# Patient Record
Sex: FEMALE
[De-identification: names, ages, dates, MRNs, and addresses within clinical notes are randomized per-mention and may not be internally consistent; named-entity substitution may affect disease eponyms.]

---

## 2018-03-22 ENCOUNTER — HOSPITAL ENCOUNTER (OUTPATIENT)
Dept: HOSPITAL 88 - CT | Age: 73
End: 2018-03-22
Payer: COMMERCIAL

## 2018-03-22 DIAGNOSIS — I65.23: Primary | ICD-10-CM

## 2018-03-22 LAB
BUN SERPL-MCNC: 16 MG/DL (ref 7–26)
BUN/CREAT SERPL: 13 (ref 6–25)
EGFRCR SERPLBLD CKD-EPI 2021: 42 ML/MIN (ref 60–?)

## 2018-03-22 PROCEDURE — 36415 COLL VENOUS BLD VENIPUNCTURE: CPT

## 2018-03-22 PROCEDURE — 84520 ASSAY OF UREA NITROGEN: CPT

## 2018-03-22 PROCEDURE — 82565 ASSAY OF CREATININE: CPT

## 2018-03-22 PROCEDURE — 70498 CT ANGIOGRAPHY NECK: CPT

## 2018-03-26 NOTE — DIAGNOSTIC IMAGING REPORT
EXAMINATION: CT angiogram of the neck



CLINICAL HISTORY:Bilateral carotid artery stenoses follow-up

COMPARISON STUDIES:None

TECHNIQUE: Axial images were obtained from the thoracic inlet. Coronal and

sagittal images reconstructed from the axial data.

Intravenous contrast: 75 mL. Of Isovue 370, hydration protocol was used.



FINDINGS:



If present, stenosis of the carotid bulbs is measured based on NASCET criteria

i.e area of maximum stenosis compared to the cervical ICA distal to the bulb.



Aortic arch and major vessels:

Patent. No abnormalities.



Common carotid arteries:

Right: Patent. No abnormalities.

Left :Patent. No abnormalities.



Carotid bulbs:

Right: Patent. No abnormalities.

Left :Patent. No abnormalities.



Internal carotid arteries:

Right: Soft and calcified plaque in the right carotid bulb results in moderate

stenosis (greater than 70 %) otherwise the cervical internal carotid artery is

unremarkable.

Left: Minimal mostly soft atherosclerotic plaque in the left carotid bulb

results in mild stenoses (less than 50%), otherwise unremarkable cervical

internal carotid artery..



Vertebral arteries:

Patent. No abnormalities. The right is dominant.



IMPRESSION:



1.  Severe stenosis of the right carotid bulb (greater than 70%).



2.  Mild stenosis of the left carotid bulb (less than 50%).



3.  Unremarkable vertebral arteries.



Signed by: Dr. Pauline Ham M.D. on 3/26/2018 9:53 AM

## 2018-08-04 ENCOUNTER — HOSPITAL ENCOUNTER (EMERGENCY)
Dept: HOSPITAL 88 - ER | Age: 73
Discharge: HOME | End: 2018-08-04
Payer: COMMERCIAL

## 2018-08-04 VITALS — DIASTOLIC BLOOD PRESSURE: 69 MMHG | SYSTOLIC BLOOD PRESSURE: 130 MMHG

## 2018-08-04 VITALS — WEIGHT: 142 LBS | BODY MASS INDEX: 24.24 KG/M2 | HEIGHT: 64 IN

## 2018-08-04 DIAGNOSIS — I65.29: ICD-10-CM

## 2018-08-04 DIAGNOSIS — E11.9: ICD-10-CM

## 2018-08-04 DIAGNOSIS — I10: Primary | ICD-10-CM

## 2018-08-04 PROCEDURE — 99282 EMERGENCY DEPT VISIT SF MDM: CPT

## 2018-08-07 LAB
ANION GAP SERPL CALC-SCNC: 14.4 MMOL/L (ref 8–16)
BASOPHILS # BLD AUTO: 0.1 10*3/UL (ref 0–0.1)
BASOPHILS NFR BLD AUTO: 1.3 % (ref 0–1)
BUN SERPL-MCNC: 20 MG/DL (ref 7–26)
BUN/CREAT SERPL: 18 (ref 6–25)
CALCIUM SERPL-MCNC: 10.4 MG/DL (ref 8.4–10.2)
CHLORIDE SERPL-SCNC: 104 MMOL/L (ref 98–107)
CO2 SERPL-SCNC: 29 MMOL/L (ref 22–29)
DEPRECATED APTT PLAS QN: 28.8 SECONDS (ref 23.8–35.5)
DEPRECATED INR PLAS: 1.02
DEPRECATED NEUTROPHILS # BLD AUTO: 4.3 10*3/UL (ref 2.1–6.9)
EGFRCR SERPLBLD CKD-EPI 2021: 48 ML/MIN (ref 60–?)
EOSINOPHIL # BLD AUTO: 0.2 10*3/UL (ref 0–0.4)
EOSINOPHIL NFR BLD AUTO: 2.3 % (ref 0–6)
ERYTHROCYTE [DISTWIDTH] IN CORD BLOOD: 13.1 % (ref 11.7–14.4)
GLUCOSE SERPLBLD-MCNC: 88 MG/DL (ref 74–118)
HCT VFR BLD AUTO: 38.8 % (ref 34.2–44.1)
HGB BLD-MCNC: 12.7 G/DL (ref 12–16)
LYMPHOCYTES # BLD: 2.1 10*3/UL (ref 1–3.2)
LYMPHOCYTES NFR BLD AUTO: 29.4 % (ref 18–39.1)
MCH RBC QN AUTO: 29.7 PG (ref 28–32)
MCHC RBC AUTO-ENTMCNC: 32.7 G/DL (ref 31–35)
MCV RBC AUTO: 90.7 FL (ref 81–99)
MONOCYTES # BLD AUTO: 0.5 10*3/UL (ref 0.2–0.8)
MONOCYTES NFR BLD AUTO: 6.6 % (ref 4.4–11.3)
NEUTS SEG NFR BLD AUTO: 60.3 % (ref 38.7–80)
PLATELET # BLD AUTO: 271 X10E3/UL (ref 140–360)
POTASSIUM SERPL-SCNC: 4.4 MMOL/L (ref 3.5–5.1)
PROTHROMBIN TIME: 12.6 SECONDS (ref 11.9–14.5)
RBC # BLD AUTO: 4.28 X10E6/UL (ref 3.6–5.1)
SODIUM SERPL-SCNC: 143 MMOL/L (ref 136–145)

## 2018-08-07 NOTE — DIAGNOSTIC IMAGING REPORT
PROCEDURE: X-RAY CHEST, TWO VIEWS

COMPARISON: None.

INDICATIONS: chest pain 

 

FINDINGS:



The lungs are well-inflated. No focal consolidation, pleural effusion, 

or pneumothorax. Left upper lobe calcified granuloma.

 

Mild tortuosity and atherosclerotic calcification of the thoracic 

aorta. Normal heart size. No pulmonary edema.

 

No acute osseous abnormality. Bilateral acromioclavicular degenerative 

changes. Surgical clips project over the right upper quadrant of the 

abdomen likely related to prior cholecystectomy.

 

 

 

CONCLUSION:

No acute cardiopulmonary abnormality. 

 

 

Dictated by:  Immanuel Molina M.D. on 8/07/2018 at 12:28     

Electronically approved by:  Immanuel Molina M.D. on 8/07/2018 at 12:28

## 2018-08-08 ENCOUNTER — HOSPITAL ENCOUNTER (INPATIENT)
Dept: HOSPITAL 88 - OR | Age: 73
LOS: 2 days | Discharge: HOME | DRG: 39 | End: 2018-08-10
Attending: THORACIC SURGERY (CARDIOTHORACIC VASCULAR SURGERY) | Admitting: THORACIC SURGERY (CARDIOTHORACIC VASCULAR SURGERY)
Payer: COMMERCIAL

## 2018-08-08 VITALS — DIASTOLIC BLOOD PRESSURE: 66 MMHG | SYSTOLIC BLOOD PRESSURE: 133 MMHG

## 2018-08-08 VITALS — DIASTOLIC BLOOD PRESSURE: 66 MMHG | SYSTOLIC BLOOD PRESSURE: 137 MMHG

## 2018-08-08 VITALS — DIASTOLIC BLOOD PRESSURE: 65 MMHG | SYSTOLIC BLOOD PRESSURE: 132 MMHG

## 2018-08-08 VITALS — DIASTOLIC BLOOD PRESSURE: 63 MMHG | SYSTOLIC BLOOD PRESSURE: 126 MMHG

## 2018-08-08 VITALS — DIASTOLIC BLOOD PRESSURE: 64 MMHG | SYSTOLIC BLOOD PRESSURE: 140 MMHG

## 2018-08-08 VITALS — WEIGHT: 167 LBS | HEIGHT: 64 IN | BODY MASS INDEX: 28.51 KG/M2

## 2018-08-08 VITALS — DIASTOLIC BLOOD PRESSURE: 69 MMHG | SYSTOLIC BLOOD PRESSURE: 131 MMHG

## 2018-08-08 VITALS — SYSTOLIC BLOOD PRESSURE: 132 MMHG | DIASTOLIC BLOOD PRESSURE: 68 MMHG

## 2018-08-08 VITALS — SYSTOLIC BLOOD PRESSURE: 128 MMHG | DIASTOLIC BLOOD PRESSURE: 65 MMHG

## 2018-08-08 VITALS — SYSTOLIC BLOOD PRESSURE: 134 MMHG | DIASTOLIC BLOOD PRESSURE: 66 MMHG

## 2018-08-08 VITALS — SYSTOLIC BLOOD PRESSURE: 121 MMHG | DIASTOLIC BLOOD PRESSURE: 63 MMHG

## 2018-08-08 VITALS — DIASTOLIC BLOOD PRESSURE: 71 MMHG | SYSTOLIC BLOOD PRESSURE: 123 MMHG

## 2018-08-08 VITALS — SYSTOLIC BLOOD PRESSURE: 131 MMHG | DIASTOLIC BLOOD PRESSURE: 67 MMHG

## 2018-08-08 VITALS — SYSTOLIC BLOOD PRESSURE: 140 MMHG | DIASTOLIC BLOOD PRESSURE: 68 MMHG

## 2018-08-08 VITALS — DIASTOLIC BLOOD PRESSURE: 69 MMHG | SYSTOLIC BLOOD PRESSURE: 129 MMHG

## 2018-08-08 VITALS — DIASTOLIC BLOOD PRESSURE: 63 MMHG | SYSTOLIC BLOOD PRESSURE: 127 MMHG

## 2018-08-08 VITALS — DIASTOLIC BLOOD PRESSURE: 64 MMHG | SYSTOLIC BLOOD PRESSURE: 123 MMHG

## 2018-08-08 VITALS — SYSTOLIC BLOOD PRESSURE: 148 MMHG | DIASTOLIC BLOOD PRESSURE: 70 MMHG

## 2018-08-08 VITALS — DIASTOLIC BLOOD PRESSURE: 60 MMHG | SYSTOLIC BLOOD PRESSURE: 130 MMHG

## 2018-08-08 DIAGNOSIS — I65.21: Primary | ICD-10-CM

## 2018-08-08 DIAGNOSIS — I25.10: ICD-10-CM

## 2018-08-08 DIAGNOSIS — I73.9: ICD-10-CM

## 2018-08-08 DIAGNOSIS — E11.9: ICD-10-CM

## 2018-08-08 DIAGNOSIS — E78.5: ICD-10-CM

## 2018-08-08 DIAGNOSIS — Z79.4: ICD-10-CM

## 2018-08-08 DIAGNOSIS — I10: ICD-10-CM

## 2018-08-08 LAB
ALBUMIN SERPL-MCNC: 3.5 G/DL (ref 3.5–5)
ALBUMIN/GLOB SERPL: 1.1 {RATIO} (ref 0.8–2)
ALP SERPL-CCNC: 40 IU/L (ref 40–150)
ALT SERPL-CCNC: 13 IU/L (ref 0–55)
ANION GAP SERPL CALC-SCNC: 15.6 MMOL/L (ref 8–16)
BASOPHILS # BLD AUTO: 0.1 10*3/UL (ref 0–0.1)
BASOPHILS NFR BLD AUTO: 0.8 % (ref 0–1)
BUN SERPL-MCNC: 18 MG/DL (ref 7–26)
BUN/CREAT SERPL: 18 (ref 6–25)
CALCIUM SERPL-MCNC: 9.5 MG/DL (ref 8.4–10.2)
CHLORIDE SERPL-SCNC: 108 MMOL/L (ref 98–107)
CO2 SERPL-SCNC: 21 MMOL/L (ref 22–29)
DEPRECATED NEUTROPHILS # BLD AUTO: 4.9 10*3/UL (ref 2.1–6.9)
EGFRCR SERPLBLD CKD-EPI 2021: 55 ML/MIN (ref 60–?)
EOSINOPHIL # BLD AUTO: 0.1 10*3/UL (ref 0–0.4)
EOSINOPHIL NFR BLD AUTO: 1.8 % (ref 0–6)
ERYTHROCYTE [DISTWIDTH] IN CORD BLOOD: 13.2 % (ref 11.7–14.4)
GLOBULIN PLAS-MCNC: 3.3 G/DL (ref 2.3–3.5)
GLUCOSE SERPLBLD-MCNC: 106 MG/DL (ref 74–118)
HCT VFR BLD AUTO: 33.5 % (ref 34.2–44.1)
HGB BLD-MCNC: 11.4 G/DL (ref 12–16)
LYMPHOCYTES # BLD: 1 10*3/UL (ref 1–3.2)
LYMPHOCYTES NFR BLD AUTO: 16.6 % (ref 18–39.1)
MCH RBC QN AUTO: 30.2 PG (ref 28–32)
MCHC RBC AUTO-ENTMCNC: 34 G/DL (ref 31–35)
MCV RBC AUTO: 88.6 FL (ref 81–99)
MONOCYTES # BLD AUTO: 0.1 10*3/UL (ref 0.2–0.8)
MONOCYTES NFR BLD AUTO: 1.9 % (ref 4.4–11.3)
NEUTS SEG NFR BLD AUTO: 78.6 % (ref 38.7–80)
PLATELET # BLD AUTO: 217 X10E3/UL (ref 140–360)
POTASSIUM SERPL-SCNC: 3.6 MMOL/L (ref 3.5–5.1)
RBC # BLD AUTO: 3.78 X10E6/UL (ref 3.6–5.1)
SODIUM SERPL-SCNC: 141 MMOL/L (ref 136–145)

## 2018-08-08 PROCEDURE — 88304 TISSUE EXAM BY PATHOLOGIST: CPT

## 2018-08-08 PROCEDURE — 86900 BLOOD TYPING SEROLOGIC ABO: CPT

## 2018-08-08 PROCEDURE — 85730 THROMBOPLASTIN TIME PARTIAL: CPT

## 2018-08-08 PROCEDURE — 80048 BASIC METABOLIC PNL TOTAL CA: CPT

## 2018-08-08 PROCEDURE — 71046 X-RAY EXAM CHEST 2 VIEWS: CPT

## 2018-08-08 PROCEDURE — 88311 DECALCIFY TISSUE: CPT

## 2018-08-08 PROCEDURE — 03CK0ZZ EXTIRPATION OF MATTER FROM RIGHT INTERNAL CAROTID ARTERY, OPEN APPROACH: ICD-10-PCS | Performed by: THORACIC SURGERY (CARDIOTHORACIC VASCULAR SURGERY)

## 2018-08-08 PROCEDURE — 93005 ELECTROCARDIOGRAM TRACING: CPT

## 2018-08-08 PROCEDURE — 86850 RBC ANTIBODY SCREEN: CPT

## 2018-08-08 PROCEDURE — 03CH0Z6: ICD-10-PCS | Performed by: THORACIC SURGERY (CARDIOTHORACIC VASCULAR SURGERY)

## 2018-08-08 PROCEDURE — 97139 UNLISTED THERAPEUTIC PX: CPT

## 2018-08-08 PROCEDURE — 85610 PROTHROMBIN TIME: CPT

## 2018-08-08 PROCEDURE — 82948 REAGENT STRIP/BLOOD GLUCOSE: CPT

## 2018-08-08 PROCEDURE — 85025 COMPLETE CBC W/AUTO DIFF WBC: CPT

## 2018-08-08 PROCEDURE — 03UK0JZ SUPPLEMENT RIGHT INTERNAL CAROTID ARTERY WITH SYNTHETIC SUBSTITUTE, OPEN APPROACH: ICD-10-PCS | Performed by: THORACIC SURGERY (CARDIOTHORACIC VASCULAR SURGERY)

## 2018-08-08 PROCEDURE — 36415 COLL VENOUS BLD VENIPUNCTURE: CPT

## 2018-08-08 PROCEDURE — 80053 COMPREHEN METABOLIC PANEL: CPT

## 2018-08-08 PROCEDURE — 86920 COMPATIBILITY TEST SPIN: CPT

## 2018-08-08 RX ADMIN — SODIUM CHLORIDE SCH MLS/HR: 9 INJECTION, SOLUTION INTRAVENOUS at 19:37

## 2018-08-09 VITALS — SYSTOLIC BLOOD PRESSURE: 136 MMHG | DIASTOLIC BLOOD PRESSURE: 82 MMHG

## 2018-08-09 VITALS — SYSTOLIC BLOOD PRESSURE: 123 MMHG | DIASTOLIC BLOOD PRESSURE: 59 MMHG

## 2018-08-09 VITALS — SYSTOLIC BLOOD PRESSURE: 161 MMHG | DIASTOLIC BLOOD PRESSURE: 65 MMHG

## 2018-08-09 VITALS — DIASTOLIC BLOOD PRESSURE: 55 MMHG | SYSTOLIC BLOOD PRESSURE: 122 MMHG

## 2018-08-09 VITALS — SYSTOLIC BLOOD PRESSURE: 129 MMHG | DIASTOLIC BLOOD PRESSURE: 68 MMHG

## 2018-08-09 VITALS — SYSTOLIC BLOOD PRESSURE: 133 MMHG | DIASTOLIC BLOOD PRESSURE: 70 MMHG

## 2018-08-09 VITALS — DIASTOLIC BLOOD PRESSURE: 76 MMHG | SYSTOLIC BLOOD PRESSURE: 131 MMHG

## 2018-08-09 VITALS — DIASTOLIC BLOOD PRESSURE: 86 MMHG | SYSTOLIC BLOOD PRESSURE: 111 MMHG

## 2018-08-09 VITALS — DIASTOLIC BLOOD PRESSURE: 65 MMHG | SYSTOLIC BLOOD PRESSURE: 127 MMHG

## 2018-08-09 VITALS — SYSTOLIC BLOOD PRESSURE: 123 MMHG | DIASTOLIC BLOOD PRESSURE: 63 MMHG

## 2018-08-09 VITALS — SYSTOLIC BLOOD PRESSURE: 136 MMHG | DIASTOLIC BLOOD PRESSURE: 68 MMHG

## 2018-08-09 VITALS — SYSTOLIC BLOOD PRESSURE: 145 MMHG | DIASTOLIC BLOOD PRESSURE: 73 MMHG

## 2018-08-09 VITALS — DIASTOLIC BLOOD PRESSURE: 60 MMHG | SYSTOLIC BLOOD PRESSURE: 117 MMHG

## 2018-08-09 VITALS — DIASTOLIC BLOOD PRESSURE: 63 MMHG | SYSTOLIC BLOOD PRESSURE: 124 MMHG

## 2018-08-09 VITALS — SYSTOLIC BLOOD PRESSURE: 149 MMHG | DIASTOLIC BLOOD PRESSURE: 74 MMHG

## 2018-08-09 VITALS — DIASTOLIC BLOOD PRESSURE: 55 MMHG | SYSTOLIC BLOOD PRESSURE: 115 MMHG

## 2018-08-09 VITALS — DIASTOLIC BLOOD PRESSURE: 70 MMHG | SYSTOLIC BLOOD PRESSURE: 135 MMHG

## 2018-08-09 VITALS — DIASTOLIC BLOOD PRESSURE: 71 MMHG | SYSTOLIC BLOOD PRESSURE: 140 MMHG

## 2018-08-09 VITALS — DIASTOLIC BLOOD PRESSURE: 64 MMHG | SYSTOLIC BLOOD PRESSURE: 126 MMHG

## 2018-08-09 VITALS — SYSTOLIC BLOOD PRESSURE: 126 MMHG | DIASTOLIC BLOOD PRESSURE: 64 MMHG

## 2018-08-09 VITALS — SYSTOLIC BLOOD PRESSURE: 135 MMHG | DIASTOLIC BLOOD PRESSURE: 67 MMHG

## 2018-08-09 VITALS — SYSTOLIC BLOOD PRESSURE: 123 MMHG | DIASTOLIC BLOOD PRESSURE: 65 MMHG

## 2018-08-09 VITALS — DIASTOLIC BLOOD PRESSURE: 63 MMHG | SYSTOLIC BLOOD PRESSURE: 132 MMHG

## 2018-08-09 VITALS — DIASTOLIC BLOOD PRESSURE: 69 MMHG | SYSTOLIC BLOOD PRESSURE: 141 MMHG

## 2018-08-09 VITALS — SYSTOLIC BLOOD PRESSURE: 125 MMHG | DIASTOLIC BLOOD PRESSURE: 57 MMHG

## 2018-08-09 VITALS — SYSTOLIC BLOOD PRESSURE: 141 MMHG | DIASTOLIC BLOOD PRESSURE: 75 MMHG

## 2018-08-09 VITALS — SYSTOLIC BLOOD PRESSURE: 122 MMHG | DIASTOLIC BLOOD PRESSURE: 65 MMHG

## 2018-08-09 VITALS — DIASTOLIC BLOOD PRESSURE: 74 MMHG | SYSTOLIC BLOOD PRESSURE: 146 MMHG

## 2018-08-09 VITALS — SYSTOLIC BLOOD PRESSURE: 126 MMHG | DIASTOLIC BLOOD PRESSURE: 60 MMHG

## 2018-08-09 VITALS — DIASTOLIC BLOOD PRESSURE: 64 MMHG | SYSTOLIC BLOOD PRESSURE: 123 MMHG

## 2018-08-09 VITALS — SYSTOLIC BLOOD PRESSURE: 130 MMHG | DIASTOLIC BLOOD PRESSURE: 63 MMHG

## 2018-08-09 VITALS — DIASTOLIC BLOOD PRESSURE: 61 MMHG | SYSTOLIC BLOOD PRESSURE: 129 MMHG

## 2018-08-09 VITALS — SYSTOLIC BLOOD PRESSURE: 129 MMHG | DIASTOLIC BLOOD PRESSURE: 65 MMHG

## 2018-08-09 VITALS — SYSTOLIC BLOOD PRESSURE: 121 MMHG | DIASTOLIC BLOOD PRESSURE: 60 MMHG

## 2018-08-09 VITALS — DIASTOLIC BLOOD PRESSURE: 61 MMHG | SYSTOLIC BLOOD PRESSURE: 125 MMHG

## 2018-08-09 VITALS — DIASTOLIC BLOOD PRESSURE: 65 MMHG | SYSTOLIC BLOOD PRESSURE: 130 MMHG

## 2018-08-09 VITALS — DIASTOLIC BLOOD PRESSURE: 68 MMHG | SYSTOLIC BLOOD PRESSURE: 125 MMHG

## 2018-08-09 VITALS — SYSTOLIC BLOOD PRESSURE: 125 MMHG | DIASTOLIC BLOOD PRESSURE: 61 MMHG

## 2018-08-09 VITALS — SYSTOLIC BLOOD PRESSURE: 141 MMHG | DIASTOLIC BLOOD PRESSURE: 71 MMHG

## 2018-08-09 VITALS — SYSTOLIC BLOOD PRESSURE: 124 MMHG | DIASTOLIC BLOOD PRESSURE: 60 MMHG

## 2018-08-09 VITALS — SYSTOLIC BLOOD PRESSURE: 136 MMHG | DIASTOLIC BLOOD PRESSURE: 65 MMHG

## 2018-08-09 VITALS — DIASTOLIC BLOOD PRESSURE: 55 MMHG | SYSTOLIC BLOOD PRESSURE: 147 MMHG

## 2018-08-09 VITALS — SYSTOLIC BLOOD PRESSURE: 138 MMHG | DIASTOLIC BLOOD PRESSURE: 69 MMHG

## 2018-08-09 VITALS — SYSTOLIC BLOOD PRESSURE: 131 MMHG | DIASTOLIC BLOOD PRESSURE: 65 MMHG

## 2018-08-09 VITALS — SYSTOLIC BLOOD PRESSURE: 137 MMHG | DIASTOLIC BLOOD PRESSURE: 70 MMHG

## 2018-08-09 VITALS — SYSTOLIC BLOOD PRESSURE: 125 MMHG | DIASTOLIC BLOOD PRESSURE: 56 MMHG

## 2018-08-09 VITALS — DIASTOLIC BLOOD PRESSURE: 67 MMHG | SYSTOLIC BLOOD PRESSURE: 131 MMHG

## 2018-08-09 VITALS — DIASTOLIC BLOOD PRESSURE: 65 MMHG | SYSTOLIC BLOOD PRESSURE: 161 MMHG

## 2018-08-09 VITALS — DIASTOLIC BLOOD PRESSURE: 78 MMHG | SYSTOLIC BLOOD PRESSURE: 147 MMHG

## 2018-08-09 LAB
ANION GAP SERPL CALC-SCNC: 13.7 MMOL/L (ref 8–16)
BASOPHILS # BLD AUTO: 0 10*3/UL (ref 0–0.1)
BASOPHILS NFR BLD AUTO: 0.1 % (ref 0–1)
BUN SERPL-MCNC: 18 MG/DL (ref 7–26)
BUN/CREAT SERPL: 20 (ref 6–25)
CALCIUM SERPL-MCNC: 9.5 MG/DL (ref 8.4–10.2)
CHLORIDE SERPL-SCNC: 105 MMOL/L (ref 98–107)
CO2 SERPL-SCNC: 24 MMOL/L (ref 22–29)
DEPRECATED NEUTROPHILS # BLD AUTO: 7.5 10*3/UL (ref 2.1–6.9)
EGFRCR SERPLBLD CKD-EPI 2021: > 60 ML/MIN (ref 60–?)
EOSINOPHIL # BLD AUTO: 0 10*3/UL (ref 0–0.4)
EOSINOPHIL NFR BLD AUTO: 0 % (ref 0–6)
ERYTHROCYTE [DISTWIDTH] IN CORD BLOOD: 13.2 % (ref 11.7–14.4)
GLUCOSE SERPLBLD-MCNC: 149 MG/DL (ref 74–118)
HCT VFR BLD AUTO: 32.3 % (ref 34.2–44.1)
HGB BLD-MCNC: 10.9 G/DL (ref 12–16)
LYMPHOCYTES # BLD: 0.8 10*3/UL (ref 1–3.2)
LYMPHOCYTES NFR BLD AUTO: 8.9 % (ref 18–39.1)
MCH RBC QN AUTO: 29.9 PG (ref 28–32)
MCHC RBC AUTO-ENTMCNC: 33.7 G/DL (ref 31–35)
MCV RBC AUTO: 88.5 FL (ref 81–99)
MONOCYTES # BLD AUTO: 0.2 10*3/UL (ref 0.2–0.8)
MONOCYTES NFR BLD AUTO: 2.6 % (ref 4.4–11.3)
NEUTS SEG NFR BLD AUTO: 88.2 % (ref 38.7–80)
PLATELET # BLD AUTO: 225 X10E3/UL (ref 140–360)
POTASSIUM SERPL-SCNC: 3.7 MMOL/L (ref 3.5–5.1)
RBC # BLD AUTO: 3.65 X10E6/UL (ref 3.6–5.1)
SODIUM SERPL-SCNC: 139 MMOL/L (ref 136–145)

## 2018-08-09 RX ADMIN — AMLODIPINE BESYLATE SCH MG: 10 TABLET ORAL at 08:19

## 2018-08-09 RX ADMIN — SODIUM CHLORIDE SCH MLS/HR: 9 INJECTION, SOLUTION INTRAVENOUS at 07:47

## 2018-08-09 RX ADMIN — METOPROLOL SUCCINATE SCH MG: 25 TABLET, EXTENDED RELEASE ORAL at 08:19

## 2018-08-09 RX ADMIN — SODIUM CHLORIDE SCH MLS/HR: 9 INJECTION, SOLUTION INTRAVENOUS at 04:52

## 2018-08-09 NOTE — OPERATIVE REPORT
DATE OF PROCEDURE:  August 08, 2018 



ASSISTANT:  Anoop Cheng



PREOPERATIVE DIAGNOSIS:  Severe right carotid stenosis.



POSTOPERATIVE DIAGNOSIS:  Severe right carotid stenosis.



TITLE OF OPERATION:  Right carotid endarterectomy.



DESCRIPTION OF OPERATION:  After the satisfactory accomplishment of general 

anesthesia, the patient's right neck was prepped and draped in a sterile 

fashion.  A standard right carotid incision was made along the anterior 

border of the sternomastoid muscle.  The incision was carried down through 

the subcutaneous tissues to expose the right common carotid artery.  The 

vessel was dissected free from the surrounding tissues and looped with a 

vessel loop.  The dissection was carried distally to expose the internal 

carotid artery and the external carotid artery and its branches.  Care was 

taken to identify and preserve all nerve structures in the region.  

Systemic heparin was given through a central vein cannula for the purposes 

of anticoagulation.  The common, external and internal carotid arteries 

were briefly cross-clamped.  A long incision was made in the common carotid 

artery and carried through the bifurcation and well up into the internal 

carotid artery.  A severely obstructing, atherosclerotic plaque was quickly 

encountered.  The plaque was removed using standard endarterectomy 

techniques.  Following this, an indwelling shunt was placed in the common 

carotid artery proximally and the internal carotid artery distally, thereby 

re-establishing blood flow to the right side of the brain for the remainder 

of the case.  The surface of the vessel was then smoothed, and all loose 

debris was carefully removed.  Heparinized saline flushes were routinely 

employed.  A previously constructed Dacron patch was brought into the 

operative field and used to close the arteriotomy site.  Running 7-0 

Prolene was used for this patch closure.  Prior to completing the closure, 

the shunt was removed, and the vessel was flushed free from all air and 

debris.  Once the sutures were tied, excellent pulses were located within 

the patch area and beyond.  Protamine was given to counteract the effects 

of the heparin.  The cannulas were removed, and the pursestring sutures 

were tied.  All bleeding points were carefully cauterized, ligated or 

oversewn.  The wound was thoroughly irrigated with antibiotic solution and 

closed in layers with interrupted 2-0 Vicryl for the deep tissues and 

Monocryl subcuticular stitches for the skin. 



The patient tolerated the procedure well and was returned to the intensive 

care unit in good condition.



 





DD:  08/09/2018 10:45

DT:  08/09/2018 10:55

Job#:  [id number]

## 2018-08-09 NOTE — CONSULTATION
DATE OF CONSULTATION:  August 08, 2018 



CARDIOLOGY CONSULTATION



REASON FOR CONSULTATION:  Status post right CEA.  



HPI:  This is a pleasant 73-year-old female with a history of severe right 

carotid stenosis.  She was having dizziness and lightheaded.  She did a 

carotid Doppler of the neck that showed severe carotid stenosis.  She was 

brought in as an outpatient.  She underwent a right CEA.  She is doing 

better and recovering in the ICU.  She has a history of hypertension.  She 

used to smoke, but she quit last week.  She denied any chest pain, any 

palpitation, any shortness of breath, or diaphoresis.  



PAST MEDICAL HISTORY:  Severe right carotid stenosis, GI bleed, 

hypertension, hyperlipidemia, diabetes, anemia, PVD, and tobacco abuse.  



PAST SURGICAL HISTORY:  Cholecystectomy and eye surgery for glaucoma.



FAMILY HISTORY:  Noncontributory.



SOCIAL HISTORY:  She lives at home with family.  She stated she quit 

smoking last week.



MEDICATIONS:  See med list.



ALLERGIES:  SHE IS NOT ALLERGIC TO ANY MEDICATION.



REVIEW OF SYSTEMS:  Negative except those mentioned above.  She is status 

post right CEA. 

 



PHYSICAL EXAMINATION

VITAL SIGNS:  Temperature 97, heart rate 76, blood pressure 147/55, 

respirations 16, oxygen saturation 97% on room air. 

GENERAL:  She is awake, alert and oriented times 3. 

HEENT:  Mucous membrane moist.  She has a dressing on the right neck with 

ice pack.

NECK:  Supple with dressing on the right side. 

LUNGS:  Clear to auscultation. 

CARDIOVASCULAR:  S1 and S2 present. 

ABDOMEN:  Soft. 

NEUROLOGICAL:  Intact. 

EXTREMITIES:  With no edema. 



LABS:  Sodium 139, potassium 3.7, chloride 105, CO2 24, BUN 18, creatinine 

0.89, glucose 149.  White blood cell 8.53, hemoglobin 10.9, hematocrit 

32.3, and platelets 225,000.  PT 12.6, PTT 28.8 and INR 1.04.  



IMPRESSION 

1.  Right carotid stenosis.

2.  Hypertension.  

3.  Diabetes.

4.  Hyperlipidemia.

5.  Tobacco abuse.

6.  History of anemia.

7.  History of peripheral vascular disease. 



ASSESSMENT AND PLAN:  She is status post right CEA and recovering good.  

Blood pressure and heart rate stable.  Will continue home medications.  PT 

to get her out of the bed.  If stable, she can be discharged home today.  

Further cardiac workup pending clinical course.  





Thank you for this consult.



DICTATED BY ALEXUS WILKINS NP





 





DD:  08/09/2018 08:12

DT:  08/09/2018 08:32

Job#:  [id number]

## 2018-08-09 NOTE — CONSULTATION
DATE OF CONSULTATION:  August 09, 2018 



PULMONARY/CRITICAL CARE CONSULTATION



REFERRING PHYSICIAN:  Dr. Gates.



CHIEF COMPLAINT:  Hypertension, peripheral vascular disease and recent 

carotid endarterectomy.  



HISTORY OF PRESENT ILLNESS:  The patient is a 73-year-old woman.  She has a 

history of hypertension, diabetes and peripheral vascular disease.  She has 

symptoms of presyncope and lightheadedness.  She went for an ultrasound of 

the carotid and subsequent CT angiogram that showed an 80% stenosis of the 

right internal carotid.  She was subsequently referred for surgery.



She went for carotid endarterectomy.  The procedure went well.  There was 

minimal blood loss.  She is in the ICU now.  She has an A line in place.  

She does have some incisional pain, but has no focal neurological 

complaints.



PAST MEDICAL HISTORY:

1. Hypertension. 

2. Diabetes.

3. Peripheral vascular disease.



PAST SURGICAL HISTORY:  Recent carotid endarterectomy. 



SOCIAL HISTORY:  The patient is not an active drinker or smoker.



FAMILY HISTORY:  Family history is noncontributory.



ALLERGIES:  THERE ARE NO KNOWN DRUG ALLERGIES.





REVIEW OF SYSTEMS:  There is no fever.  She has no headache.  She is 

complaining of some incisional pain in the neck.  She has no chest pain.  

She has no difficulty breathing.  She has no cough or wheezing.  She has no 

abdominal pain.  She has no nausea or vomiting.  She has no focal 

neurological complaints. 

 

PHYSICAL EXAMINATION:

VITAL SIGNS:  The patient is afebrile.  The blood pressure is 124/60 and 

the saturation is 97%. 

HEENT:  Shows no facial swelling or erythema.  The nasal mucosa is normal. 

CARDIAC:  Reveals a regular rate and rhythm with normal S1 and S2.  There 

are no murmurs or rubs.  

LUNGS:  Auscultation reveals clear breath sounds bilaterally.  There is no 

wheezing. 

ABDOMEN:  Soft, nontender.  There is no rebound or guarding. 

EXTREMITIES:  Shows no underlying edema or calf tenderness.  There is no 

cyanosis or clubbing.

SKIN:  No rashes. 

NEUROLOGIC:  Shows no focal abnormalities. 



LABORATORY DATA:  Electrolytes:  BUN and creatinine are within normal 

limits.  The CBC is normal.  PT and INR are normal. 



IMPRESSION:  

1. Recent carotid endarterectomy. 

2. Hypertension.

3. Diabetes.



PLAN:  

1. Remove arterial line.

2. Mobilize patient.



3. Control blood pressure.

4. Tentative discharged later today or tomorrow. 









DD:  08/09/2018 07:47

DT:  08/09/2018 08:13

Job#:  [id number]

## 2018-08-10 VITALS — SYSTOLIC BLOOD PRESSURE: 136 MMHG | DIASTOLIC BLOOD PRESSURE: 63 MMHG

## 2018-08-10 VITALS — SYSTOLIC BLOOD PRESSURE: 133 MMHG | DIASTOLIC BLOOD PRESSURE: 55 MMHG

## 2018-08-10 VITALS — SYSTOLIC BLOOD PRESSURE: 163 MMHG | DIASTOLIC BLOOD PRESSURE: 73 MMHG

## 2018-08-10 VITALS — SYSTOLIC BLOOD PRESSURE: 137 MMHG | DIASTOLIC BLOOD PRESSURE: 68 MMHG

## 2018-08-10 RX ADMIN — AMLODIPINE BESYLATE SCH MG: 10 TABLET ORAL at 08:36

## 2018-08-10 RX ADMIN — SODIUM CHLORIDE SCH MLS/HR: 9 INJECTION, SOLUTION INTRAVENOUS at 11:00

## 2018-08-10 RX ADMIN — SODIUM CHLORIDE SCH MLS/HR: 9 INJECTION, SOLUTION INTRAVENOUS at 01:00

## 2018-08-10 RX ADMIN — METOPROLOL SUCCINATE SCH MG: 25 TABLET, EXTENDED RELEASE ORAL at 08:37

## 2018-10-10 ENCOUNTER — HOSPITAL ENCOUNTER (EMERGENCY)
Dept: HOSPITAL 88 - ER | Age: 73
Discharge: HOME | End: 2018-10-10
Payer: COMMERCIAL

## 2018-10-10 VITALS — HEIGHT: 64 IN | WEIGHT: 167 LBS | BODY MASS INDEX: 28.51 KG/M2

## 2018-10-10 DIAGNOSIS — I10: Primary | ICD-10-CM

## 2018-10-10 DIAGNOSIS — Z87.891: ICD-10-CM

## 2018-10-10 DIAGNOSIS — Z82.49: ICD-10-CM

## 2018-10-10 PROCEDURE — 93005 ELECTROCARDIOGRAM TRACING: CPT

## 2018-10-10 PROCEDURE — 99282 EMERGENCY DEPT VISIT SF MDM: CPT

## 2020-11-22 ENCOUNTER — HOSPITAL ENCOUNTER (EMERGENCY)
Dept: HOSPITAL 88 - ER | Age: 75
LOS: 1 days | Discharge: HOME | End: 2020-11-23
Payer: COMMERCIAL

## 2020-11-22 VITALS — BODY MASS INDEX: 24.41 KG/M2 | HEIGHT: 64 IN | WEIGHT: 143 LBS

## 2020-11-22 DIAGNOSIS — R00.2: Primary | ICD-10-CM

## 2020-11-22 DIAGNOSIS — E11.65: ICD-10-CM

## 2020-11-22 DIAGNOSIS — I10: ICD-10-CM

## 2020-11-22 DIAGNOSIS — E78.5: ICD-10-CM

## 2020-11-22 DIAGNOSIS — I16.0: ICD-10-CM

## 2020-11-22 DIAGNOSIS — F17.210: ICD-10-CM

## 2020-11-22 LAB
ALBUMIN SERPL-MCNC: 3.7 G/DL (ref 3.5–5)
ALBUMIN/GLOB SERPL: 1.1 {RATIO} (ref 0.8–2)
ALP SERPL-CCNC: 49 IU/L (ref 40–150)
ALT SERPL-CCNC: 14 IU/L (ref 0–55)
ANION GAP SERPL CALC-SCNC: 15.8 MMOL/L (ref 8–16)
BASOPHILS # BLD AUTO: 0.1 10*3/UL (ref 0–0.1)
BASOPHILS NFR BLD AUTO: 1.1 % (ref 0–1)
BUN SERPL-MCNC: 20 MG/DL (ref 7–26)
BUN/CREAT SERPL: 16 (ref 6–25)
CALCIUM SERPL-MCNC: 9.7 MG/DL (ref 8.4–10.2)
CHLORIDE SERPL-SCNC: 105 MMOL/L (ref 98–107)
CK MB SERPL-MCNC: 1.1 NG/ML (ref 0–5)
CK SERPL-CCNC: 58 IU/L (ref 29–168)
CO2 SERPL-SCNC: 23 MMOL/L (ref 22–29)
DEPRECATED NEUTROPHILS # BLD AUTO: 1.9 10*3/UL (ref 2.1–6.9)
EGFRCR SERPLBLD CKD-EPI 2021: 43 ML/MIN (ref 60–?)
EOSINOPHIL # BLD AUTO: 0.2 10*3/UL (ref 0–0.4)
EOSINOPHIL NFR BLD AUTO: 4.2 % (ref 0–6)
ERYTHROCYTE [DISTWIDTH] IN CORD BLOOD: 13.8 % (ref 11.7–14.4)
GLOBULIN PLAS-MCNC: 3.3 G/DL (ref 2.3–3.5)
GLUCOSE SERPLBLD-MCNC: 176 MG/DL (ref 74–118)
HCT VFR BLD AUTO: 32.8 % (ref 34.2–44.1)
HGB BLD-MCNC: 10.7 G/DL (ref 12–16)
LYMPHOCYTES # BLD: 2.1 10*3/UL (ref 1–3.2)
LYMPHOCYTES NFR BLD AUTO: 43.7 % (ref 18–39.1)
MCH RBC QN AUTO: 29.9 PG (ref 28–32)
MCHC RBC AUTO-ENTMCNC: 32.6 G/DL (ref 31–35)
MCV RBC AUTO: 91.6 FL (ref 81–99)
MONOCYTES # BLD AUTO: 0.5 10*3/UL (ref 0.2–0.8)
MONOCYTES NFR BLD AUTO: 10.1 % (ref 4.4–11.3)
NEUTS SEG NFR BLD AUTO: 40.5 % (ref 38.7–80)
PLATELET # BLD AUTO: 200 X10E3/UL (ref 140–360)
POTASSIUM SERPL-SCNC: 3.8 MMOL/L (ref 3.5–5.1)
RBC # BLD AUTO: 3.58 X10E6/UL (ref 3.6–5.1)
SODIUM SERPL-SCNC: 140 MMOL/L (ref 136–145)

## 2020-11-22 PROCEDURE — 82553 CREATINE MB FRACTION: CPT

## 2020-11-22 PROCEDURE — 82550 ASSAY OF CK (CPK): CPT

## 2020-11-22 PROCEDURE — 85025 COMPLETE CBC W/AUTO DIFF WBC: CPT

## 2020-11-22 PROCEDURE — 71045 X-RAY EXAM CHEST 1 VIEW: CPT

## 2020-11-22 PROCEDURE — 99284 EMERGENCY DEPT VISIT MOD MDM: CPT

## 2020-11-22 PROCEDURE — 80053 COMPREHEN METABOLIC PANEL: CPT

## 2020-11-22 PROCEDURE — 84484 ASSAY OF TROPONIN QUANT: CPT

## 2020-11-22 PROCEDURE — 36415 COLL VENOUS BLD VENIPUNCTURE: CPT

## 2020-11-22 PROCEDURE — 83880 ASSAY OF NATRIURETIC PEPTIDE: CPT

## 2020-11-22 PROCEDURE — 93005 ELECTROCARDIOGRAM TRACING: CPT

## 2020-11-22 PROCEDURE — 70450 CT HEAD/BRAIN W/O DYE: CPT

## 2020-11-22 RX ADMIN — ASPIRIN 81 MG CHEWABLE TABLET ONE MG: 81 TABLET CHEWABLE at 22:13

## 2020-11-22 RX ADMIN — SODIUM CHLORIDE STA MLS/HR: 9 INJECTION, SOLUTION INTRAVENOUS at 22:13

## 2020-11-22 NOTE — EMERGENCY DEPARTMENT NOTE
History of Present Illnes


History of Present Illness


Chief Complaint:  Hypertension


History of Present Illness


This is a 75 year old  female  with onset of palpitations and dizziness 

at . Denies CP or SOB. at Home noted SBP of 199 mmHg


Historian:  Patient, Paramedic/EMS


Arrival Mode:  HFD


Onset (how long ago):  hour(s) (1)


Location:  chest and head


Radiation:  Reports non-radiation


Severity:  moderate


Onset quality:  sudden


Duration (how long):  hour(s) (2)


Timing of current episode:  constant


Progression:  partially resolved


Chronicity:  new


Context:  Denies recent illness, Denies recent surgery, Denies recent 

immobilization, Denies recent travel, Denies trauma/injury, Denies new med

ications, Denies hx of DVT/PE, Denies non-compliance w/ medications, Denies 

other


Relieving factors:  none


Exacerbating factors:  none


Associated symptoms:  Denies chest pain, Denies syncope, Denies weakness


Treatments prior to arrival:  none





Past Medical/Family History


Physician Review


I have reviewed the patient's past medical and family history.  Any updates have

been documented here.





Past Medical History


Recent Fever:  No


Clinical Suspicion of Infectio:  No


New/Unexplained Change in Ment:  No


Past Medical History:  Hypertension, Diabetes, CAD, Hyperlipedemia


Other Medical History:  


smoker 40 yrs


Past Surgical History:  Cholecysctectomy, Cataract Removal


Other Surgery:  


Retina repair





CATROID





Social History


Smoking Cessation:  Current some day smoker


Counseling Performed:  Yes


Alcohol Use:  None





Other


Last Tetanus:  OOD





Review of Systems


Review of Systems


Constitutional:  Reports no symptoms


EENTM:  Reports no symptoms


Cardiovascular:  Reports palpitations


Respiratory:  Reports no symptoms


Gastrointestinal:  Reports no symptoms


Genitourinary:  Reports no symptoms


Musculoskeletal:  Reports no symptoms


Integumentary:  Reports no symptoms


Neurological:  Reports other (dizziness)


Psychological:  Reports no symptoms


Endocrine:  Reports no symptoms


Hematological/Lymphatic:  Reports no symptoms





Physical Exam


Related Data


Allergies:  


Coded Allergies:  


     No Known Allergies (Unverified , 17)


Vital signs reviewed:  Yes





Physical Exam


CONSTITUTIONAL





Constitutional:  Present well-developed, Present well-nourished


HENT


HENT:  Present normocephalic, Present atraumatic, Present oropharynx 

clear/moist, Present nose normal


HENT L/R:  Present left ext ear normal, Present right ext ear normal


EYES





Eyes:  Reports PERRL, Reports conjunctivae normal


NECK


Neck:  Present ROM normal


PULMONARY


Pulmonary:  Present effort normal, Present breath sounds normal


CARDIOVASCULAR





Cardiovascular:  Present regular rhythm, Present heart sounds normal, Present 

capillary refill normal, Present normal rate


GASTROINTESTINAL





Abdominal:  Present soft, Present nontender, Present bowel sounds normal


GENITOURINARY





Genitourinary:  Present exam deferred


SKIN


Skin:  Present warm, Present dry


MUSCULOSKELETAL





Musculoskeletal:  Present ROM normal


NEUROLOGICAL





Neurological:  Present alert, Present oriented x 3, Present no gross motor or 

sensory deficits


PSYCHOLOGICAL


Psychological:  Present mood/affect normal, Present judgement normal





Results


Laboratory


Lab results reviewed:  Yes





Imaging


Imaging results reviewed:  Yes


Impressions


                                        


                        [facility]


            [address] [address]








Patient Name: CADEN NEW                          MR #: [id number]           


  


: 1945                         Age/Sex: 75/F


Acct #: [id number]                    Req #: [id number]


Adm Physician:                                      


Ordered by: LALITA DOUGLAS DO                    Report #: [id number] 


Location: ER                            Room/Bed:           


                                                                                


________________________________________________________________________________


___________________





Procedure: [id number] CT/CT BRAIN WO


Exam Date: 20                            Exam Time: 3








                              REPORT STATUS: Signed





Exam: Head CT without contrast


History:  Dizziness


Comparison studies:  None





Technique:


Axial images were obtained from the skull base to the vertex.


Coronal and sagittal images reconstructed from the axial data.  Dose


modulation, iterative reconstruction, and/or weight based adjustment of the


mA/kV was utilized to reduce the radiation dose to as low as reasonably


achievable.  





Radiation dose: 


Total DLP: 921 mGy*cm. 


Estimated effective dose: DLP x 0.015 


Intravenous contrast: None





Findings:





Scalp: No abnormalities.


Bones: No fractures, blastic or lytic lesions.





Brain sulci: Mildly prominent.


Ventricles: Normal in size and configuration. No hydrocephalus.


Extra-axial spaces: No masses, no fluid collection. 





Parenchyma: 


A few scattered hypodensities in the supratentorial white matter are


nonspecific but are most compatible with chronic microvascular ischemic


changes. No mass, acute hemorrhage or acute or chronic cortical insults.





Sellar/suprasellar region: No abnormalities.


Craniocervical junction: Patent foramen magnum. No Chiari one malformation.





Incidental findings: 


Atherosclerotic calcifications in the carotid siphons in the right intradural


vertebral artery.





 IMPRESSION:


 


1.  No acute intracranial abnormalities.


2.  Mild chronic microvascular ischemic changes.





Signed by: Dr. Josh Rodriguez M.D. on 2020 10:41 PM








Dictated By: JOSH RODRIGUEZ MD


Electronically Signed By: JOSH RODRIGUEZ MD on 20


Transcribed By: ROSENDO on 20 








COPY TO:   LALITA DOUGLAS DO~





Procedures


12 Lead ECG Interpretation


ECG Interpretation :  


   ECG:  ECG 1


   :  Interpreted by ED physician


   Date:  2020


   Time:  21:52


   Prior ECG tracings:  reviewed


   Rhythm:  sinus rhythm


   Rate:  normal


   BPM:  76


   QRS axis:  normal


   ST segments normal:  Yes


   T waves normal:  Yes


   Other findings:  PRWP


   Clinical Impression:  non-specific ECG





Assessment & Plan


Medical Decision Making


MDM


Diff Dx : ACS, Intracranial brain bleeding, PNA, PTX





Assessment & Plan


Final Impression:  


(1) Palpitations


(2) Hypertensive urgency


Home Meds


Reported Medications


Aspirin (ASPIR 81) 81 Mg Tablet.dr, 81 MG PO DAILY


   18


Triamterene/Hydrochlorothiazid (TRIAMTERENE-HCTZ 37.5-25 MG CP) 1 Each Capsule, 

37.5 MG PO DAILY


   17


Metoprolol Succinate (METOPROLOL SUCCINATE) 25 Mg Tab.er.24h, 25 MG PO DAILY


   17


Pravastatin Sodium (PRAVASTATIN SODIUM) 80 Mg Tablet, 80 MG PO DAILY


   THERAPEUTICALLY SUBSTITUTED WITH SIMVASTATIN 40MG


   17


Amlodipine Besylate (NORVASC) 10 Mg Tab, 10 MG PO DAILY


   17


Fenofibrate (FENOFIBRATE) 200 Mg Cap, 200 MG PO HS


   17


Metformin Hcl (METFORMIN HCL) 500 Mg Tablet, 500 MG PO DAILY, #60 TAB


   17











LALITA DOUGLAS DO                2020 21:58

## 2020-11-22 NOTE — XMS REPORT
Continuity of Care Document

                             Created on: 2020



CADEN NEW

External Reference #: [id number]

: 1945

Sex: Female



Demographics





                          Address                   [address] [place]  [address]

 

                          Home Phone                [phone]

 

                          Preferred Language        Unknown

 

                          Marital Status            Unknown

 

                          Catholic Affiliation     Unknown

 

                          Race                      Unknown

 

                                        Additional Race(s)  

 

                          Ethnic Group              Unknown





Author





                          Author                    [facility]

t

 

                          Organization              [facility]

 

                          Address                   [address]

[place]  [address]



 

                          Phone                     Unavailable







Care Team Providers





                    Care Team Member Name Role                Phone

 

                    STACIA GONZALEZ,  ANKUSH PCP                 [phone]

 

                    LALITA DOUGLAS      Attphys             Unavailable

 

                    EDILMA AKHTAR   Attphys             Unavailable

 

                    JARON NUNO   Attphys             Unavailable

 

                    APARNA SANDERS    Attphys             Unavailable

 

                    MARISA MARI       Attphys             Unavailable

 

                    JOSE PRASAD        Admphys             Unavailable







Payers





           Payer Name Policy Type Policy Number Effective Date Expiration Date S

ource

 

           [employer]            [id number]  2018 00:00:00            [facility]







Problems





           Condition Name Condition Details Condition Category Status     Onset 

Date Resolution

Date            Last Treatment Date Treating Clinician Comments        Source

 

       Cholelithiasis Cholelithiasis Problem Active                             

       [facility]







Allergies, Adverse Reactions, Alerts

This patient has no known allergies or adverse reactions.



Medications





             Ordered Medication Name Filled Medication Name Start Date   Stop Da

te    Current 

Medication? Ordering Clinician Indication Dosage     Frequency  Signature (SIG) 

Comments                  Components                Source

 

                    Amlodipine Besylate (Norvasc) 10 Mg Tab Amlodipine Besylate 

(Norvasc) 10 Mg Tab 

              Yes                  10            Daily                [facility]

 

          Aspirin (Aspir 81) 81 Mg Tablet. Aspirin (Aspir 81) 81 Mg Tablet. 

                    Yes                 

           81                    Daily                            [facility]

 

     Fenofibrate 200 Mg Cap Fenofibrate 200 Mg Cap           Yes            200 

      Bedtime           [facility]

 

      Metformin Hcl 500 Mg Tablet Metformin Hcl 500 Mg Tablet             Yes   

            500         Daily 

                                                    [facility]

 

             Metoprolol Succinate 25 Mg Tab.er.24h Metoprolol Succinate 25 Mg Ta

b.er.24h                           

Yes                     25              Daily                   [facility]

 

        Pravastatin Sodium 80 Mg Tablet Pravastatin Sodium 80 Mg Tablet         

        Yes                     80       

                Daily                                           [facility]

 

                          Triamterene/Hydrochlorothiazid (Triamterene-Hctz 37.5-

25 Mg Cp) 1 Each Capsule 

Triamterene/Hydrochlorothiazid (Triamterene-Hctz 37.5-25 Mg Cp) 1 Each Capsule  

              Yes                  37.5          Daily                [facility]

 

                                        Acetaminophen With Codeine (Tylenol With

 Codeine #3 Tablet) 1 Each Tablet, 300 

Mg Oral                                 Acetaminophen With Codeine (Tylenol With

 Codeine #3 Tablet) 1 Each 

Tablet, 300 Mg Oral         2018 00:00:00 No                      300     

        Every 4 Hours as needed 

for Pain                                                    [facility]

 

                          Levofloxacin (Levaquin) 500 Mg Tablet, 500 Mg Oral Lev

ofloxacin (Levaquin) 500 

Mg Tablet, 500 Mg Oral       2018 00:00:00 No                500         D

aily             [facility]

 

                          Ondansetron (Zofran Odt) 4 Mg Tab.rapdis, 4 Mg Subling

ual Ondansetron (Zofran 

Odt) 4 Mg Tab.rapdis, 4 Mg Sublingual         2018 00:00:00 No            

          4               Every 6 

Hours as needed for Nausea And Vomiting                                         

[facility]

 

                Aspirin 81 Mg Tab.chew, 81 Mg Oral Aspirin 81 Mg Tab.chew, 81 Mg

 Oral                 

2017-04-15 00:00:00 No                      81              Daily               

    [facility]







Procedures





                Procedure       Date / Time Performed Performing Clinician Sour

e

 

                    EXTIRPATE MATTER FROM R COM CAROTID, BIFURC, OPEN 2018

 00:00:00 [facility]

 

                    EXTIRPATION OF MATTER FROM R INT CAROTID, OPEN APPROACH 2018 00:00:00 

[facility]

 

                    SUPPLEMENT R INT CAROTID WITH SYNTH SUB, OPEN APPROACH  00:00:00 

[facility]

 

                X-ray of chest, two views 2018 00:00:00 EDILMA AKHTAR

[facility]

 

                CT angiography of neck 2018 00:00:00 CAYENNE, JARON [facility]







Encounters





             Start Date/Time End Date/Time Encounter Type [facility]   [facility] Department Encounter ID    Source

 

          2018-10-10 01:52:00 2018-10-10 02:32:00 Departed Emergency Room       

              [facility]                    [id number]              [facility]

 

           2018 16:58:00 2018-08-10 13:16:00 Discharged Inpatient 3       

   EDILMA AKHTAR 

[facility]              [id number]        [facility]

 

          2018 00:49:00 2018 02:24:00 Departed Emergency Room       

              [facility]                    [id number]              [facility]

 

           2018 13:34:00 2018 13:34:00 Registered Clinic JARON BULLOCK 

[facility]              [id number]        [facility]







Results





           Test Description Test Time  Test Comments Results    Result Comments 

Source

 

                CT BRAIN WO     2020 22:32:00                 

[facility]        : 1945        Sex: 
F************************************************************                   
                                                                  [place] [address]      Patient Name: CADEN NEW            
                   MR #: [id number]                  : 1945            
                      Age/Sex: 75/F  Acct #: [id number]                       
      Req #: [id number]  Adm Physician:                                         
            Ordered by: LALITA DOUGLAS DO                         Report #: [id number]
[id number]        Location: ER                                      Room/Bed:         
         
________________________________________________________________________________

___________________    Procedure: [id number] CT/CT BRAIN WO  Exam Date: 20 
                          Exam Time: 3                                       
      REPORT STATUS: Signed    Exam: Head CT without contrast   History:  
Dizziness   Comparison studies:  None      Technique:   Axial images were 
obtained from the skull base to the vertex.   Coronal and sagittal images 
reconstructed from the axial data.  Dose   modulation, iterative reconstruction,
and/or weight based adjustment of the   mA/kV was utilized to reduce the 
radiation dose to as low as reasonably   achievable.        Radiation dose:    
Total DLP: 921 mGy*cm.    Estimated effective dose: DLP x 0.015    Intravenous 
contrast: None      Findings:      Scalp: No abnormalities.   Bones: No 
fractures, blastic or lytic lesions.      Brain sulci: Mildly prominent.   
Ventricles: Normal in size and configuration. No hydrocephalus.   Extra-axial 
spaces: No masses, no fluid collection.       Parenchyma:    A few scattered 
hypodensities in the supratentorial white matter are   nonspecific but are most 
compatible with chronic microvascular ischemic   changes. No mass, acute 
hemorrhage or acute or chronic cortical insults.      Sellar/suprasellar region:
No abnormalities.   Craniocervical junction: Patent foramen magnum. No Chiari 
one malformation.      Incidental findings:    Atherosclerotic calcifications in
the carotid siphons in the right intradural   vertebral artery.       IMPRESSIO
N:       1.  No acute intracranial abnormalities.   2.  Mild chronic 
microvascular ischemic changes.      Signed by: Dr. Josh Rodriguez M.D. on 
2020 10:41 PM        Dictated By: JOSH RODRIGUEZ MD  Electronically 
Signed By: JOSH RODRIGUEZ MD on 20  Transcribed By: ROSENDO on 
20       COPY TO:   LALITA DOUGLAS DO                                  

   

 

                SCR MAMM BILATERAL MALLORY CAD DIGITAL 2019 09:33:56         

         - SCR MAMM BILATERAL 

MALLORY CAD DIGITALBILATERAL DIGITAL SCREENING MAMMOGRAM 3D/2D WITH CAD: 
2019CLINICAL: Asymptomatic.  Digital breast tomosynthesis was performed in
addition to routine CC and MLO views.  Current mammographic images were 
evaluated by either a "[employer] M-Vu or a [employer] ImageChecker CAD (computer aided 
detection system).  Comparison is made to exams dated  4/10/2018 mammogram - [facility] and 2016 mammogram - [facility].  
There are scattered fibroglandular tissues in both breasts.  There are benign 
calcifications in the left breast.  No suspicious mass, architectural 
distortion, malignant type calcification, or lymph node abnormality detected.  
Breast architecture is stable compared to prior exams.IMPRESSION: BENIGNThere is
no mammographic evidence of malignancy. Resume annual screening mammography in 
one year.  Tato Sheets M.D.          ss/penrad:2019 09:33:56  
Imaging Technologist: Kandis ELI, [facility] sent: 
BIRADS 1-2 Normal  Mammogram BI-RADS: 2 Benign                            

 

                    White Blood Count   2018 05:40:00   

 

                                        Test Item

 

             White Blood Count (test code = 6690-2) 8.53         4.8-10.8       

            





[facility] [id number] 05:40:00* 



             Test Item    Value        Reference Range Interpretation Comments

 

             Red Blood Count (test code = 789-8) 3.65         3.6-5.1           

         





[facility]2018-[facility] 05:40:00* 



             Test Item    Value        Reference Range Interpretation Comments

 

             Hemoglobin (test code = 16086-2) 10.9         12.0-16.0    L       

      





[facility] HospitalHematocrit2018-08-[facility] 05:40:00* 



             Test Item    Value        Reference Range Interpretation Comments

 

             Hematocrit (test code = 4544-3) 32.3         34.2-44.1    L        

     





[facility] HospitalMean Corpuscular Pzdbej6550-06-32 
05:40:00* 



             Test Item    Value        Reference Range Interpretation Comments

 

             Mean Corpuscular Volume (test code = 787-2) 88.5         81-99     

                 





[facility] Corpuscular Vubricptjr7607-75-07 
05:40:00* 



             Test Item    Value        Reference Range Interpretation Comments

 

             Mean Corpuscular Hemoglobin (test code = 785-6) 29.9         28-32 

                     





[facility]
2018 05:40:00* 



             Test Item    Value        Reference Range Interpretation Comments

 

             Mean Corpuscular Hemoglobin Concent (test code = 786-4) 33.7       

  31-35                      





[facility] HospitalRed [facility] Distribution Vzlao2037-39-74 
05:40:00* 



             Test Item    Value        Reference Range Interpretation Comments

 

             Red Cell Distribution Width (test code = 82644-4) 13.2         11.7

-14.4                  





[facility] HospitalPlatelet Yshsm4269-46-38 05:40:00* 



             Test Item    Value        Reference Range Interpretation Comments

 

             Platelet Count (test code = 777-3) 225          140-360            

        





[facility] HospitalNeutrophils (%) (Auto)2018 05:40:00
  * 



             Test Item    Value        Reference Range Interpretation Comments

 

             Neutrophils (%) (Auto) (test code = 92213-8) 88.2         38.7-80.0

    H             





[facility] HospitalLymphocytes (%) (Auto)2018 05:40:00
  * 



             Test Item    Value        Reference Range Interpretation Comments

 

             Lymphocytes (%) (Auto) (test code = 736-9) 8.9          18.0-39.1  

  L             





[facility] HospitalMonocytes (%) (Auto)2018 05:40:00* 



             Test Item    Value        Reference Range Interpretation Comments

 

             Monocytes (%) (Auto) (test code = 5905-5) 2.6          4.4-11.3    

 L             





[facility] HospitalEosinophils (%) (Auto)2018 05:40:00
  * 



             Test Item    Value        Reference Range Interpretation Comments

 

             Eosinophils (%) (Auto) (test code = 713-8) 0.0          0.0-6.0    

                





[facility] HospitalBasophils (%) (Auto)2018 05:40:00* 



             Test Item    Value        Reference Range Interpretation Comments

 

             Basophils (%) (Auto) (test code = 706-2) 0.1          0.0-1.0      

              





[facility] HospitalIM GRANULOCYTES %2018 05:40:00* 



             Test Item    Value        Reference Range Interpretation Comments

 

             IM GRANULOCYTES % (test code = IM GRANULOCYTES %) 0.2          0.0-

1.0                    





[facility] HospitalNeutrophils # (Auto)2018 05:40:00* 



             Test Item    Value        Reference Range Interpretation Comments

 

             Neutrophils # (Auto) (test code = 751-8) 7.5          2.1-6.9      

H             





[facility] HospitalLymphocytes # (Auto)2018 05:40:00* 



             Test Item    Value        Reference Range Interpretation Comments

 

             Lymphocytes # (Auto) (test code = 11264-7) 0.8          1.0-3.2    

  L             





[facility] HospitalMonocytes # (Auto)2018 05:40:00* 



             Test Item    Value        Reference Range Interpretation Comments

 

             Monocytes # (Auto) (test code = 742-7) 0.2          0.2-0.8        

            





[facility] HospitalEosinophils # (Auto)2018 05:40:00* 



             Test Item    Value        Reference Range Interpretation Comments

 

             Eosinophils # (Auto) (test code = 711-2) 0.0          0.0-0.4      

              





[facility] HospitalBasophils # (Auto)2018 05:40:00* 



             Test Item    Value        Reference Range Interpretation Comments

 

             Basophils # (Auto) (test code = 704-7) 0.0          0.0-0.1        

            





[facility] HospitalAbsolute Immature Granulocyte (auto
2018 05:40:00* 



             Test Item    Value        Reference Range Interpretation Comments

 

                                        Absolute Immature Granulocyte (auto (del

t code = Absolute Immature Granulocyte 

(auto)          0.02            0-0.1                            





[facility] HospitalWhite Blood Tzgxv6639-28-89 05:40:00* 



             Test Item    Value        Reference Range Interpretation Comments

 

             White Blood Count (test code = 6690-2) 8.53         4.8-10.8       

            





[facility] HospitalRed Blood Hjwhn2769-27-17 05:40:00* 



             Test Item    Value        Reference Range Interpretation Comments

 

             Red Blood Count (test code = 789-8) 3.65         3.6-5.1           

         





[facility] HospitalHemoglobin2018-08-09 05:40:00* 



             Test Item    Value        Reference Range Interpretation Comments

 

             Hemoglobin (test code = 92349-2) 10.9         12.0-16.0    L       

      





[facility] HospitalHematocrit2018-08-09 05:40:00* 



             Test Item    Value        Reference Range Interpretation Comments

 

             Hematocrit (test code = 4544-3) 32.3         34.2-44.1    L        

     





[facility] HospitalMean Corpuscular Ioesnl7768-48-35 
05:40:00* 



             Test Item    Value        Reference Range Interpretation Comments

 

             Mean Corpuscular Volume (test code = 787-2) 88.5         81-99     

                 





[facility] HospitalMean Corpuscular Izpsygxhaa6546-02-24 
05:40:00* 



             Test Item    Value        Reference Range Interpretation Comments

 

             Mean Corpuscular Hemoglobin (test code = 785-6) 29.9         28-32 

                     





[facility] HospitalMean Corpuscular Hemoglobin Concent
2018 05:40:00* 



             Test Item    Value        Reference Range Interpretation Comments

 

             Mean Corpuscular Hemoglobin Concent (test code = 786-4) 33.7       

  31-35                      





[facility] HospitalRed Cell Distribution Jzcvn1713-01-84 
05:40:00* 



             Test Item    Value        Reference Range Interpretation Comments

 

             Red Cell Distribution Width (test code = 16865-6) 13.2         11.7

-14.4                  





[facility] HospitalPlatelet Trubp8322-25-95 05:40:00* 



             Test Item    Value        Reference Range Interpretation Comments

 

             Platelet Count (test code = 777-3) 225          140-360            

        





[facility] HospitalNeutrophils (%) (Auto)2018 05:40:00
  * 



             Test Item    Value        Reference Range Interpretation Comments

 

             Neutrophils (%) (Auto) (test code = 17906-8) 88.2         38.7-80.0

    H             





[facility] HospitalLymphocytes (%) (Auto)2018 05:40:00
  * 



             Test Item    Value        Reference Range Interpretation Comments

 

             Lymphocytes (%) (Auto) (test code = 736-9) 8.9          18.0-39.1  

  L             





[facility] HospitalMonocytes (%) (Auto)2018 05:40:00* 



             Test Item    Value        Reference Range Interpretation Comments

 

             Monocytes (%) (Auto) (test code = 5905-5) 2.6          4.4-11.3    

 L             





[facility] HospitalEosinophils (%) (Auto)2018 05:40:00
  * 



             Test Item    Value        Reference Range Interpretation Comments

 

             Eosinophils (%) (Auto) (test code = 713-8) 0.0          0.0-6.0    

                





[facility] HospitalBasophils (%) (Auto)2018 05:40:00* 



             Test Item    Value        Reference Range Interpretation Comments

 

             Basophils (%) (Auto) (test code = 706-2) 0.1          0.0-1.0      

              





[facility] HospitalIM GRANULOCYTES %2018 05:40:00* 



             Test Item    Value        Reference Range Interpretation Comments

 

             IM GRANULOCYTES % (test code = IM GRANULOCYTES %) 0.2          0.0-

1.0                    





[facility] HospitalNeutrophils # (Auto)2018 05:40:00* 



             Test Item    Value        Reference Range Interpretation Comments

 

             Neutrophils # (Auto) (test code = 751-8) 7.5          2.1-6.9      

H             





[facility] HospitalLymphocytes # (Auto)2018 05:40:00* 



             Test Item    Value        Reference Range Interpretation Comments

 

             Lymphocytes # (Auto) (test code = 40584-7) 0.8          1.0-3.2    

  L             





[facility] HospitalMonocytes # (Auto)2018 05:40:00* 



             Test Item    Value        Reference Range Interpretation Comments

 

             Monocytes # (Auto) (test code = 742-7) 0.2          0.2-0.8        

            





[facility] HospitalEosinophils # (Auto)2018 05:40:00* 



             Test Item    Value        Reference Range Interpretation Comments

 

             Eosinophils # (Auto) (test code = 711-2) 0.0          0.0-0.4      

              





[facility] HospitalBasophils # (Auto)2018 05:40:00* 



             Test Item    Value        Reference Range Interpretation Comments

 

             Basophils # (Auto) (test code = 704-7) 0.0          0.0-0.1        

            





[facility] HospitalAbsolute Immature Granulocyte (auto
2018 05:40:00* 



             Test Item    Value        Reference Range Interpretation Comments

 

                                        Absolute Immature Granulocyte (auto (del

t code = Absolute Immature Granulocyte 

(auto)          0.02            0-0.1                            





[facility] Vgfzg7804-51-50 05:31:00* 



             Test Item    Value        Reference Range Interpretation Comments

 

             Sodium Level (test code = 2951-2) 139          136-145             

       





[facility] HospitalPotassium Viarm2823-33-63 05:31:00* 



             Test Item    Value        Reference Range Interpretation Comments

 

             Potassium Level (test code = 2823-3) 3.7          3.5-5.1          

          





[facility] HospitalChloride Ccyjw9564-14-87 05:31:00* 



             Test Item    Value        Reference Range Interpretation Comments

 

             Chloride Level (test code = 2075-0) 105                      

         





[facility] HospitalCarbon Dioxide Nhdcx7359-60-90 05:31:00* 



             Test Item    Value        Reference Range Interpretation Comments

 

             Carbon Dioxide Level (test code = 2028-9) 24           22-29       

               





[facility] HospitalAnion Iku2185-06-52 05:31:00* 



             Test Item    Value        Reference Range Interpretation Comments

 

             Anion Gap (test code = 33037-3) 13.7         8-16                  

     





[facility] HospitalBlood Urea Bdsabwmo1648-48-93 05:31:00* 



             Test Item    Value        Reference Range Interpretation Comments

 

             Blood Urea Nitrogen (test code = 3094-0) 18           7-26         

              





[facility] HospitalCreatinine2018-08-09 05:31:00* 



             Test Item    Value        Reference Range Interpretation Comments

 

             Creatinine (test code = 2160-0) 0.89         0.57-1.11             

     





[facility] HospitalBUN/Creatinine Eqxji7813-75-76 05:31:00* 



             Test Item    Value        Reference Range Interpretation Comments

 

             BUN/Creatinine Ratio (test code = 3097-3) 20           6-25        

               





[facility] HospitalEstimat Glomerular Filtration Rate
2018 05:31:00* 



             Test Item    Value        Reference Range Interpretation Comments

 

             Estimat Glomerular Filtration Rate (test code = 80142-5) 60-       

   >60                        





Ranges were taken from the National Kidney Disease Education Program and the [facility] [employer] literature.Reference ranges:60 or greater: Rmjtnl77-37 (
for 3 consecutive months): Chronic kidney disease 15 or less: Kidney failureSeymour HospitalGlucose Wwoas7436-25-08 05:31:00* 



             Test Item    Value        Reference Range Interpretation Comments

 

             Glucose Level (test code = WWD7374) 149                 H    

         





[facility] HospitalCalcium Oeshy1162-56-24 05:31:00* 



             Test Item    Value        Reference Range Interpretation Comments

 

             Calcium Level (test code = 62725-5) 9.5          8.4-10.2          

         





[facility] Fdlwf7819-21-14 05:31:00* 



             Test Item    Value        Reference Range Interpretation Comments

 

             Sodium Level (test code = 2951-2) 139          136-145             

       





[facility]Pot[facility] Wlyus6117-14-34 05:31:00* 



             Test Item    Value        Reference Range Interpretation Comments

 

             Potassium Level (test code = 2823-3) 3.7          3.5-5.1          

          





[facility] HospitalChloride Kcbcp6409-89-39 05:31:00* 



             Test Item    Value        Reference Range Interpretation Comments

 

             Chloride Level (test code = 2075-0) 105                      

         





[facility] HospitalCarbon Dioxide Dlapj0754-96-18 05:31:00* 



             Test Item    Value        Reference Range Interpretation Comments

 

             Carbon Dioxide Level (test code = 2028-9) 24           22-29       

               





[facility] HospitalAnion Rft0507-58-59 05:31:00* 



             Test Item    Value        Reference Range Interpretation Comments

 

             Anion Gap (test code = 33037-3) 13.7         8-16                  

     





[facility] HospitalBlood Urea Bdxeujoa7544-68-60 05:31:00* 



             Test Item    Value        Reference Range Interpretation Comments

 

             Blood Urea Nitrogen (test code = 3094-0) 18           7-26         

              





[facility] HospitalCreatinine2018-08-09 05:31:00* 



             Test Item    Value        Reference Range Interpretation Comments

 

             Creatinine (test code = 2160-0) 0.89         0.57-1.11             

     





[facility] HospitalBUN/Creatinine Rspuy4490-30-99 05:31:00* 



             Test Item    Value        Reference Range Interpretation Comments

 

             BUN/Creatinine Ratio (test code = 3097-3) 20           6-25        

               





[facility] HospitalEstimat Glomerular Filtration Rate
2018 05:31:00* 



             Test Item    Value        Reference Range Interpretation Comments

 

             Estimat Glomerular Filtration Rate (test code = 411052344) 60-     

     >60                        





Ranges were taken from the National Kidney Disease Education Program and the [facility] Kidney Foundation literature.Reference ranges:60 or greater: Eykiyn33-68 (
for 3 consecutive months): Chronic kidney disease 15 or less: Kidney failureCHI 
[facility] CenterGlucose Bebeo7299-39-74 05:31:00* 



             Test Item    Value        Reference Range Interpretation Comments

 

             Glucose Level (test code = WES3826) 149                 H    

         





[facility] HospitalCalcium Vcxxp5077-44-86 05:31:00* 



             Test Item    Value        Reference Range Interpretation Comments

 

             Calcium Level (test code = 82423-8) 9.5          8.4-10.2          

         





[facility] HospitalTotal Xsfzumrfh3486-87-29 17:33:00* 



             Test Item    Value        Reference Range Interpretation Comments

 

             Total Bilirubin (test code = 1975-2) 0.5          0.2-1.2          

          





[facility] HospitalAspartate Amino Transf (AST/SGOT)
2018 17:33:00* 



             Test Item    Value        Reference Range Interpretation Comments

 

                                        Aspartate Amino Transf (AST/SGOT) (test 

code = Aspartate Amino Transf 

(AST/SGOT))     20              5-34                             





[facility] HospitalAlanine Aminotransferase (ALT/SGPT)
2018 17:33:00* 



             Test Item    Value        Reference Range Interpretation Comments

 

             Alanine Aminotransferase (ALT/SGPT) (test code = 1742-6) 13        

   0-55                       





[facility] HospitalTotal Fwlqflo7771-11-71 17:33:00* 



             Test Item    Value        Reference Range Interpretation Comments

 

             Total Protein (test code = 2885-2) 6.8          6.5-8.1            

        





[facility] HospitalAlbumin2018-08-08 17:33:00* 



             Test Item    Value        Reference Range Interpretation Comments

 

             Albumin (test code = 1751-7) 3.5          3.5-5.0                  

  





[facility] HospitalGlobulin2018-08-08 17:33:00* 



             Test Item    Value        Reference Range Interpretation Comments

 

             Globulin (test code = 94150-7) 3.3          2.3-3.5                

    





[facility] HospitalAlbumin/Globulin Zbhxw9521-20-03 17:33:00
  * 



             Test Item    Value        Reference Range Interpretation Comments

 

             Albumin/Globulin Ratio (test code = 1759-0) 1.1          0.8-2.0   

                 





[facility] HospitalAlkaline Gzrlmoprfup3828-28-43 17:33:00* 



             Test Item    Value        Reference Range Interpretation Comments

 

             Alkaline Phosphatase (test code = 6768-6) 40                 

               





[facility] HospitalTotal Dvhstoorv5363-99-92 17:33:00* 



             Test Item    Value        Reference Range Interpretation Comments

 

             Total Bilirubin (test code = 1975-2) 0.5          0.2-1.2          

          





[facility] HospitalAspartate Amino Transf (AST/SGOT)
2018 17:33:00* 



             Test Item    Value        Reference Range Interpretation Comments

 

                                        Aspartate Amino Transf (AST/SGOT) (test 

code = Aspartate Amino Transf 

(AST/SGOT))     20              5-34                             





[facility] HospitalAlanine Aminotransferase (ALT/SGPT)
2018 17:33:00* 



             Test Item    Value        Reference Range Interpretation Comments

 

             Alanine Aminotransferase (ALT/SGPT) (test code = 1742-6) 13        

   0-55                       





[facility] HospitalTotal Ezenqxg1474-60-42 17:33:00* 



             Test Item    Value        Reference Range Interpretation Comments

 

             Total Protein (test code = 2885-2) 6.8          6.5-8.1            

        





[facility] HospitalAlbumin2018-08-08 17:33:00* 



             Test Item    Value        Reference Range Interpretation Comments

 

             Albumin (test code = 1751-7) 3.5          3.5-5.0                  

  





[facility] HospitalGlobulin2018-08-08 17:33:00* 



             Test Item    Value        Reference Range Interpretation Comments

 

             Globulin (test code = 07550-5) 3.3          2.3-3.5                

    





[facility] HospitalAlbumin/Globulin Qfxys1282-97-76 17:33:00
  * 



             Test Item    Value        Reference Range Interpretation Comments

 

             Albumin/Globulin Ratio (test code = 1759-0) 1.1          0.8-2.0   

                 





[facility] HospitalAlkaline Asnnmxyxpoy9748-89-39 17:33:00* 



             Test Item    Value        Reference Range Interpretation Comments

 

             Alkaline Phosphatase (test code = 6768-6) 40                 

               





[facility] Hptuosr6107-73-41 12:43:00* 



             Test Item    Value        Reference Range Interpretation Comments

 

             Bedside Glucose (test code = 61950-0) 86                     

           





Meter ID: [id number] Medical Center Glucose
2018 12:43:00* 



             Test Item    Value        Reference Range Interpretation Comments

 

             Bedside Glucose (test code = 30706-3) 86                     

           





Meter ID: [id number] HospitalProthrombin Time
2018 12:32:00* 



             Test Item    Value        Reference Range Interpretation Comments

 

             Prothrombin Time (test code = 5902-2) 12.6         11.9-14.5       

           





[facility] HospitalProthromb Time International Ratio
2018 12:32:00* 



             Test Item    Value        Reference Range Interpretation Comments

 

             Prothromb Time International Ratio (test code = 6301-6) 1.02       

                             





Oral Anticoagulant Therapy INR Values:1. Low Intensity Therapy        1.5 - 2.02
. Moderate Intensity Therapy   2.0 - 3.03. High Intensity Therapy(1)    2.5 - 3.
54. High Intensity Therapy(2)    3.0 - 4.05. Panic Value INR              > 5.0
[facility] HospitalActivated Partial Thromboplast Time
2018 12:32:00* 



             Test Item    Value        Reference Range Interpretation Comments

 

             Activated Partial Thromboplast Time (test code = 69485-1) 28.8     

    23.8-35.5                  





[facility] HospitalProthrombin Fmsk8401-98-65 12:32:00* 



             Test Item    Value        Reference Range Interpretation Comments

 

             Prothrombin Time (test code = 5902-2) 12.6         11.9-14.5       

           





[facility] HospitalProthromb Time International Ratio
2018 12:32:00* 



             Test Item    Value        Reference Range Interpretation Comments

 

             Prothromb Time International Ratio (test code = 6301-6) 1.02       

                             





Oral Anticoagulant Therapy INR Values:1. Low Intensity Therapy        1.5 - 2.02
. Moderate Intensity Therapy   2.0 - 3.03. High Intensity Therapy(1)    2.5 - 3.
54. High Intensity Therapy(2)    3.0 - 4.05. Panic Value INR              > 5.0
[facility] HospitalActivated Partial Thromboplast Time
2018 12:32:00* 



             Test Item    Value        Reference Range Interpretation Comments

 

             Activated Partial Thromboplast Time (test code = 61522-8) 28.8     

    23.8-35.5                  





[facility] HospitalCHEST 2 FFJKY6508-49-08 12:28:00    [facility]   [address] [address]      Patient Name: CADEN NEW   MR #: [id number]    :  Age/Sex: 73/F  Acct #: [id number] Req #: [id number]  Adm Physician:  
  Ordered by: EDILMA AKHTAR MD  Report #: [id number]   Location: OR  Room/Bed:
    __________________________________________________________________________
_________________________    Procedure: [id number] DX/CHEST 2 VIEWS  Exam Date: 0
18                            Exam Time: 1200       REPORT STATUS: Signed  
 PROCEDURE: X-RAY CHEST, TWO VIEWS   COMPARISON: None.   INDICATIONS: chest pain
       FINDINGS:         The lungs are well-inflated. No focal consolidation, 
pleural effusion,    or pneumothorax. Left upper lobe calcified granuloma.      
Mild tortuosity and atherosclerotic calcification of the thoracic    aorta. Nor
mal heart size. No pulmonary edema.       No acute osseous abnormality. Bilatera
l acromioclavicular degenerative    changes. Surgical clips project over the rig
ht upper quadrant of the    abdomen likely related to prior cholecystectomy.    
          CONCLUSION:      No acute cardiopulmonary abnormality.            Dic
tated by:  Josh Molina M.D. on 2018 at 12:28        Electronically approved
by:  Josh Molina M.D. on 2018 at 12:28                Dictated By: JOSH MOLINA MD  Electronically Signed By: JOSH MOLINA MD on 18 1228  Transcri
bed By: DARLEEN on 18 1228       COPY TO:   EDILMA AKHTAR MD        Blood 
Urea Vzpoxcpc4612-87-47 14:40:00* 



             Test Item    Value        Reference Range Interpretation Comments

 

             Blood Urea Nitrogen (test code = 3094-0) 16           -         

              





[facility] HospitalCreatinine2018-03-22 14:40:00* 



             Test Item    Value        Reference Range Interpretation Comments

 

             Creatinine (test code = 2160-0) 1.25         0.57-1.11    H        

     





[facility] HospitalBUN/Creatinine Akptl2445-53-03 14:40:00* 



             Test Item    Value        Reference Range Interpretation Comments

 

             BUN/Creatinine Ratio (test code = 3097-3) 13                   

               





[facility] HospitalEstimat Glomerular Filtration Rate
2018 14:40:00* 



             Test Item    Value        Reference Range Interpretation Comments

 

             Estimat Glomerular Filtration Rate (test code = 70958-4) 42        

   >60          L             





Ranges were taken from the National Kidney Disease Education Program and the [facility] literature.Reference ranges:60 or greater: Xowzft73-49 (
for 3 consecutive months): Chronic kidney disease 15 or less: Kidney failureCHI 
[facility] CenterCTA NECK    [address] [address]      Patient Name: 
CADEN NEW   MR #: [id number]    : 1945 Age/Sex: 72/F  Acct #: 
[id number] Req #: [id number]  Adm Physician:     Ordered by: JARON NUNO MD  Report #: [id number]   Location: CT  Room/Bed:     
__________________________________________________________________________
_________________________    Procedure: [id number] CT/CTA NECK  Exam Date:                             Exam Time: 1536       REPORT STATUS: Signed    EXA
MINATION: CT angiogram of the neck      CLINICAL HISTORY:Bilateral carotid arter
y stenoses follow-up   COMPARISON STUDIES:None   TECHNIQUE: Axial images were ob
tained from the thoracic inlet. Coronal and   sagittal images reconstructed from
the axial data.   Intravenous contrast: 75 mL. Of Isovue 370, hydration protocol
was used.      FINDINGS:      If present, stenosis of the carotid bulbs is raul
sured based on NASCET criteria   i.e area of maximum stenosis compared to the ce
rvical ICA distal to the bulb.      Aortic arch and major vessels:   Patent. No 
abnormalities.      Common carotid arteries:   Right: Patent. No abnormalities. 
 Left :Patent. No abnormalities.      Carotid bulbs:   Right: Patent. No abnorm
alities.   Left :Patent. No abnormalities.      Internal carotid arteries:   Rig
ht: Soft and calcified plaque in the right carotid bulb results in moderate   st
enosis (greater than 70 %) otherwise the cervical internal carotid artery is   u
nremarkable.   Left: Minimal mostly soft atherosclerotic plaque in the left cobian
tid bulb   results in mild stenoses (less than 50%), otherwise unremarkable cerv
ical   internal carotid artery..      Vertebral arteries:   Patent. No abnormali
ties. The right is dominant.      IMPRESSION:      1.  Severe stenosis of the ri
ght carotid bulb (greater than 70%).      2.  Mild stenosis of the left carotid 
bulb (less than 50%).      3.  Unremarkable vertebral arteries.      Signed by: 
Dr. Connie Ham M.D. on 3/26/2018 9:53 AM        Dictated By: CONNIE HAM MD  E
lectronically Signed By: CONNIE HAM MD on 18  Transcribed By: CONSTANZA GARCIA on 18       COPY TO:   JARON NUNO MD        CT ABDOMEN/PELVIS 
[address] [place] [address]      Patient Name: CADEN NEW   MR #: [id number]    
: 1945 Age/Sex: 72/F  Acct #: [id number] Req #: [id number]  Adm 
Physician:     Ordered by: DAVIDA SANDERS MD  Report #: [id number]   Location: 
ER  Room/Bed:     
__________________________________________________________________________
_________________________    Procedure: [id number] CT/CT ABDOMEN/PELVIS WO  Exam 
Date: 10/07/17                            Exam Time:        REPORT STATUS: S
igned    EXAM: CT ABDOMEN AND PELVIS without IV CONTRAST   DATE: 10/7/2017 7:36 
PM  Time stamp on Exam:  hours   INDICATION:  Gallbladder removed one week a
go, now with epigastric pain and   nausea   COMPARISON:  None    TECHNIQUE: The 
abdomen and pelvis were scanned using a multidetector helical   scanner. Coronal
and sagittal reformations were obtained. Routine protocol   performed.   IV Con
trast: None   Oral Contrast: Gastrografin   CTDIvol has been reviewed. It is bel
ow the limits set by the Radiation Protocol   Committee (RPC).      FINDINGS:   
LOWER THORAX: Linear scarring or atelectasis in each lung base.      LIVER: No m
asses   BILIARY: Small amount of fluid and punctate air in the gallbladder fossa
.   Cholecystectomy clips.       SPLEEN: No masses   PANCREAS: No masses      AD
RENALS: No nodules   KIDNEYS: No nephroureterolithiasis or hydronephrosis.      
GI TRACT: Nonspecific thickening of the first portion of the duodenum. Normal   
appendix.      VESSELS: Atherosclerotic changes of the abdominal aorta.   PERITO
NEUM/RETROPERITONEUM: No free air or fluid   LYMPH NODES: No lymphadenopathy    
 REPRODUCTIVE ORGANS: The patient has a curvilinear intrauterine device, that is
  anteriorly located in the uterus. The uterus is large for age.   BLADDER: Un
remarkable      SOFT TISSUES: Unremarkable   BONES: No suspicious bone lesions. 
    IMPRESSION:   Expected postsurgical changes of recent cholecystectomy.      
Wall thickening of the first portion of the duodenum, more than expected for   
peristalsis. Consider duodenitis in the appropriate clinical setting.      There
is an intrauterine device that is anteriorly located in the uterus. The   uterus
is large for age. This could be secondary to a large fibroid. Consider   nonem
ergent follow-up pelvic and transvaginal ultrasound.      Signed by: Dr. Bob Lane M.D. on 10/7/2017 9:42 PM        Dictated By: BOB LANE MD  Elec
tronically Signed By: BOB LANE MD on 10/07/17 2142  Transcribed By: ROSENDO
on 10/07/17 2142       COPY TO:   DAVIDA SANDERS MD        US GALLBLADDER    [address] [address]      Patient Name: CADEN NEW   MR #: [id number]    :  Age/Sex: 72/F  Acct #: [id number] Req #: [id number]  Adm Physician:  
  Ordered by: TERRIE MARI MD  Report #: [id number]   Location: ER  Room/Bed:   
 _____________________________________________________________________________
______________________    Procedure: [id number] US/US GALLBLADDER  Exam Date:    
                        Exam Time:        REPORT STATUS: Signed    PROCEDURE:   
US GALLBLADDER   COMPARISON:   [facility], CT, CT ABDOMEN/PELVIS W,
   2017, 17:26.   INDICATIONS:   Abdominal pain   TECHNIQUE: Grey-scale and
color doppler transverse and longitudinal    images of the right upper quadrant 
of the abdomen were obtained.       FINDINGS:          Liver: 13.4 cm in right 
mid-clavicular line. Normal echogenicity. No    masses.   Main portal vein: 0.9 
cm, hepatopetal flow       Gallbladder: 2 mobile, echogenic stones are noted in 
the gallbladder    lumen, measuring 3.2 x 0.9 x 2.5 cm and 1.8 x 1.1 x 2.7 cm. 
Mild wall    thickening, measuring 0.5 cm. Trace pericholecystic fluid.   Common
Bile Duct: 0.5 cm. cm   Sonographic Collins's sign: Negative        Right kidney:
10.2 cm cm. Normal echogenicity. No solid masses or    hydronephrosis.       
Pancreas: The visualized portions of the neck and proximal body are    unremarka
ble.       Inferior vena cava: Patent   Aorta: Within normal limits    Ascites: 
None in the right upper quadrant of the abdomen.       CONCLUSION:      1. Adamaris
lithiasis with mild gallbladder wall thickening and trace    pericholecystic flu
id, however, the sonographic Collins's sign is    negative. Findings are similar 
to prior CT dated 2017. Low    likelihood of acute cholecystitis.          
 Toño Wharton M.D.     Dictated by:  Toño Wharton M.D. on 2017 
at 19:04        Electronically approved by:  Toño Wharton M.D. on 20
17 at    19:04                Dictated By: TOÑO WHARTON MD  Electronically Sig
yolanda By: TOÑO WHARTON MD on 17 190  Transcribed By: DARLEEN on 17 19
       COPY TO:   TERRIE MARI MD

## 2020-11-22 NOTE — DIAGNOSTIC IMAGING REPORT
Exam: Head CT without contrast

History:  Dizziness

Comparison studies:  None



Technique:

Axial images were obtained from the skull base to the vertex.

Coronal and sagittal images reconstructed from the axial data.  Dose

modulation, iterative reconstruction, and/or weight based adjustment of the

mA/kV was utilized to reduce the radiation dose to as low as reasonably

achievable.  



Radiation dose: 

Total DLP: 921 mGy*cm. 

Estimated effective dose: DLP x 0.015 

Intravenous contrast: None



Findings:



Scalp: No abnormalities.

Bones: No fractures, blastic or lytic lesions.



Brain sulci: Mildly prominent.

Ventricles: Normal in size and configuration. No hydrocephalus.

Extra-axial spaces: No masses, no fluid collection. 



Parenchyma: 

A few scattered hypodensities in the supratentorial white matter are

nonspecific but are most compatible with chronic microvascular ischemic

changes. No mass, acute hemorrhage or acute or chronic cortical insults.



Sellar/suprasellar region: No abnormalities.

Craniocervical junction: Patent foramen magnum. No Chiari one malformation.



Incidental findings: 

Atherosclerotic calcifications in the carotid siphons in the right intradural

vertebral artery.



 IMPRESSION:

 

1.  No acute intracranial abnormalities.

2.  Mild chronic microvascular ischemic changes.



Signed by: Dr. Immanuel Valentino M.D. on 11/22/2020 10:41 PM

## 2020-11-23 VITALS — DIASTOLIC BLOOD PRESSURE: 64 MMHG | SYSTOLIC BLOOD PRESSURE: 144 MMHG

## 2020-11-23 NOTE — DIAGNOSTIC IMAGING REPORT
X-ray chest frontal view



History: Palpitations



Comparison: None



Findings:



Lines and tubes: No change



Central airways: Unremarkable



Cardiac silhouette: Unremarkable



Mediastinal silhouettes: Unremarkable



Pleura: No pleural effusion, pneumothorax or thickening



Diaphragms: Unremarkable



Lungs: No focal lung disease



Skeletal structures: Unremarkable



Extrathoracic soft tissues: Unremarkable



Impression: No acute cardiopulmonary disease.



Signed by: Rene James MD on 11/23/2020 8:50 AM

## 2021-09-19 ENCOUNTER — HOSPITAL ENCOUNTER (EMERGENCY)
Dept: HOSPITAL 88 - FSED | Age: 76
Discharge: HOME | End: 2021-09-19
Payer: COMMERCIAL

## 2021-09-19 VITALS — WEIGHT: 143 LBS | BODY MASS INDEX: 24.41 KG/M2 | HEIGHT: 64 IN

## 2021-09-19 DIAGNOSIS — E78.5: ICD-10-CM

## 2021-09-19 DIAGNOSIS — T46.5X1A: Primary | ICD-10-CM

## 2021-09-19 DIAGNOSIS — I10: ICD-10-CM

## 2021-09-19 PROCEDURE — 99282 EMERGENCY DEPT VISIT SF MDM: CPT
